# Patient Record
Sex: FEMALE | Race: WHITE | NOT HISPANIC OR LATINO | Employment: OTHER | URBAN - METROPOLITAN AREA
[De-identification: names, ages, dates, MRNs, and addresses within clinical notes are randomized per-mention and may not be internally consistent; named-entity substitution may affect disease eponyms.]

---

## 2023-11-25 DIAGNOSIS — E78.5 HYPERLIPIDEMIA, UNSPECIFIED HYPERLIPIDEMIA TYPE: Primary | ICD-10-CM

## 2023-11-27 RX ORDER — EZETIMIBE 10 MG/1
10 TABLET ORAL DAILY
Qty: 90 TABLET | Refills: 1 | Status: SHIPPED | OUTPATIENT
Start: 2023-11-27 | End: 2024-05-20

## 2023-12-12 ENCOUNTER — APPOINTMENT (OUTPATIENT)
Dept: PRIMARY CARE | Facility: CLINIC | Age: 83
End: 2023-12-12
Payer: COMMERCIAL

## 2024-02-21 DIAGNOSIS — I10 BENIGN ESSENTIAL HYPERTENSION: Primary | ICD-10-CM

## 2024-02-22 PROBLEM — I10 BENIGN ESSENTIAL HYPERTENSION: Status: ACTIVE | Noted: 2024-02-22

## 2024-02-22 RX ORDER — METOPROLOL TARTRATE 100 MG/1
100 TABLET ORAL 2 TIMES DAILY
Qty: 180 TABLET | Refills: 1 | Status: SHIPPED | OUTPATIENT
Start: 2024-02-22

## 2024-03-07 ENCOUNTER — PROCEDURE VISIT (OUTPATIENT)
Dept: PRIMARY CARE | Facility: CLINIC | Age: 84
End: 2024-03-07
Payer: COMMERCIAL

## 2024-03-07 VITALS — SYSTOLIC BLOOD PRESSURE: 154 MMHG | HEART RATE: 78 BPM | OXYGEN SATURATION: 96 % | DIASTOLIC BLOOD PRESSURE: 90 MMHG

## 2024-03-07 DIAGNOSIS — E78.2 MIXED HYPERLIPIDEMIA: ICD-10-CM

## 2024-03-07 DIAGNOSIS — R73.01 IMPAIRED FASTING GLUCOSE: ICD-10-CM

## 2024-03-07 DIAGNOSIS — I48.20 CHRONIC ATRIAL FIBRILLATION (MULTI): Primary | ICD-10-CM

## 2024-03-07 DIAGNOSIS — I10 BENIGN ESSENTIAL HYPERTENSION: ICD-10-CM

## 2024-03-07 DIAGNOSIS — M17.0 PRIMARY OSTEOARTHRITIS OF BOTH KNEES: ICD-10-CM

## 2024-03-07 PROCEDURE — 20610 DRAIN/INJ JOINT/BURSA W/O US: CPT | Mod: 50 | Performed by: PHYSICIAN ASSISTANT

## 2024-03-07 PROCEDURE — 20610 DRAIN/INJ JOINT/BURSA W/O US: CPT | Performed by: PHYSICIAN ASSISTANT

## 2024-03-07 PROCEDURE — 99213 OFFICE O/P EST LOW 20 MIN: CPT | Performed by: PHYSICIAN ASSISTANT

## 2024-03-07 RX ORDER — LOSARTAN POTASSIUM 100 MG/1
100 TABLET ORAL DAILY
COMMUNITY

## 2024-03-07 RX ORDER — FUROSEMIDE 40 MG/1
40 TABLET ORAL DAILY
COMMUNITY
Start: 2018-04-03

## 2024-03-07 RX ORDER — APIXABAN 5 MG/1
5 TABLET, FILM COATED ORAL 2 TIMES DAILY
COMMUNITY
End: 2024-03-25 | Stop reason: SDUPTHER

## 2024-03-07 RX ORDER — POTASSIUM CHLORIDE 750 MG/1
10 TABLET, EXTENDED RELEASE ORAL
COMMUNITY
End: 2024-05-13 | Stop reason: ALTCHOICE

## 2024-03-07 ASSESSMENT — PATIENT HEALTH QUESTIONNAIRE - PHQ9
SUM OF ALL RESPONSES TO PHQ9 QUESTIONS 1 AND 2: 0
1. LITTLE INTEREST OR PLEASURE IN DOING THINGS: NOT AT ALL
2. FEELING DOWN, DEPRESSED OR HOPELESS: NOT AT ALL

## 2024-03-07 NOTE — PROGRESS NOTES
Subjective   Patient ID: Beulah Dockery is a 83 y.o. female who presents for b/l cortisone shots  (Patient states she has been increasingly gaining weight since being put on BP meds and the water pills. ).    Hx of B/L knee OA with some response to CSI in the past requesting repeat injection. No other new concerns.          Review of Systems   All other systems reviewed and are negative.      Objective   /90   Pulse 78   SpO2 96%     Physical Exam  Constitutional:       General: She is not in acute distress.     Appearance: Normal appearance. She is obese.   HENT:      Nose: Nose normal.   Cardiovascular:      Rate and Rhythm: Normal rate and regular rhythm.   Musculoskeletal:      Comments: B/L knees with poor ROM, crepitus, joint space tenderness and mild effusions.    Neurological:      Mental Status: She is alert.         Assessment/Plan   Diagnoses and all orders for this visit:  Chronic atrial fibrillation (CMS/HCC)  Primary osteoarthritis of both knees  Benign essential hypertension  Mixed hyperlipidemia    B/L knee injections: each knee was cleansed with betadine and under sterile conditions using a 22g needle the joint was injected with Kenalog 40mg, lidocaine 2% 3ml through the lateral joint space. Pt tolerated the procedure well without complication.

## 2024-03-18 ENCOUNTER — APPOINTMENT (OUTPATIENT)
Dept: CARDIOLOGY | Facility: CLINIC | Age: 84
End: 2024-03-18
Payer: COMMERCIAL

## 2024-03-25 DIAGNOSIS — I48.91 ATRIAL FIBRILLATION, UNSPECIFIED TYPE (MULTI): Primary | ICD-10-CM

## 2024-03-25 RX ORDER — APIXABAN 5 MG/1
5 TABLET, FILM COATED ORAL 2 TIMES DAILY
Qty: 180 TABLET | Refills: 1 | Status: SHIPPED | OUTPATIENT
Start: 2024-03-25 | End: 2025-03-25

## 2024-03-27 ENCOUNTER — LAB (OUTPATIENT)
Dept: LAB | Facility: LAB | Age: 84
End: 2024-03-27
Payer: COMMERCIAL

## 2024-03-27 DIAGNOSIS — R73.01 IMPAIRED FASTING GLUCOSE: ICD-10-CM

## 2024-03-27 DIAGNOSIS — E78.2 MIXED HYPERLIPIDEMIA: ICD-10-CM

## 2024-03-27 LAB
ALBUMIN SERPL BCP-MCNC: 4 G/DL (ref 3.4–5)
ALP SERPL-CCNC: 78 U/L (ref 33–136)
ALT SERPL W P-5'-P-CCNC: 16 U/L (ref 7–45)
ANION GAP SERPL CALC-SCNC: 13 MMOL/L (ref 10–20)
AST SERPL W P-5'-P-CCNC: 16 U/L (ref 9–39)
BILIRUB SERPL-MCNC: 0.6 MG/DL (ref 0–1.2)
BUN SERPL-MCNC: 22 MG/DL (ref 6–23)
CALCIUM SERPL-MCNC: 10.2 MG/DL (ref 8.6–10.6)
CHLORIDE SERPL-SCNC: 103 MMOL/L (ref 98–107)
CHOLEST SERPL-MCNC: 233 MG/DL (ref 0–199)
CHOLESTEROL/HDL RATIO: 3.9
CO2 SERPL-SCNC: 31 MMOL/L (ref 21–32)
CREAT SERPL-MCNC: 0.77 MG/DL (ref 0.5–1.05)
EGFRCR SERPLBLD CKD-EPI 2021: 77 ML/MIN/1.73M*2
EST. AVERAGE GLUCOSE BLD GHB EST-MCNC: 137 MG/DL
GLUCOSE SERPL-MCNC: 128 MG/DL (ref 74–99)
HBA1C MFR BLD: 6.4 %
HDLC SERPL-MCNC: 59 MG/DL
LDLC SERPL CALC-MCNC: 142 MG/DL
NON HDL CHOLESTEROL: 174 MG/DL (ref 0–149)
POTASSIUM SERPL-SCNC: 4.6 MMOL/L (ref 3.5–5.3)
PROT SERPL-MCNC: 7 G/DL (ref 6.4–8.2)
SODIUM SERPL-SCNC: 142 MMOL/L (ref 136–145)
TRIGL SERPL-MCNC: 159 MG/DL (ref 0–149)
VLDL: 32 MG/DL (ref 0–40)

## 2024-03-27 PROCEDURE — 36415 COLL VENOUS BLD VENIPUNCTURE: CPT

## 2024-03-27 PROCEDURE — 83036 HEMOGLOBIN GLYCOSYLATED A1C: CPT

## 2024-03-27 PROCEDURE — 80053 COMPREHEN METABOLIC PANEL: CPT

## 2024-03-27 PROCEDURE — 80061 LIPID PANEL: CPT

## 2024-05-13 ENCOUNTER — OFFICE VISIT (OUTPATIENT)
Dept: CARDIOLOGY | Facility: CLINIC | Age: 84
End: 2024-05-13
Payer: COMMERCIAL

## 2024-05-13 VITALS — HEART RATE: 116 BPM | OXYGEN SATURATION: 96 % | DIASTOLIC BLOOD PRESSURE: 104 MMHG | SYSTOLIC BLOOD PRESSURE: 158 MMHG

## 2024-05-13 DIAGNOSIS — I10 BENIGN ESSENTIAL HYPERTENSION: ICD-10-CM

## 2024-05-13 DIAGNOSIS — I48.20 CHRONIC ATRIAL FIBRILLATION (MULTI): Primary | ICD-10-CM

## 2024-05-13 PROCEDURE — 1036F TOBACCO NON-USER: CPT | Performed by: INTERNAL MEDICINE

## 2024-05-13 PROCEDURE — 1159F MED LIST DOCD IN RCRD: CPT | Performed by: INTERNAL MEDICINE

## 2024-05-13 PROCEDURE — 3077F SYST BP >= 140 MM HG: CPT | Performed by: INTERNAL MEDICINE

## 2024-05-13 PROCEDURE — 99214 OFFICE O/P EST MOD 30 MIN: CPT | Performed by: INTERNAL MEDICINE

## 2024-05-13 PROCEDURE — 1126F AMNT PAIN NOTED NONE PRSNT: CPT | Performed by: INTERNAL MEDICINE

## 2024-05-13 PROCEDURE — 3080F DIAST BP >= 90 MM HG: CPT | Performed by: INTERNAL MEDICINE

## 2024-05-13 RX ORDER — AMLODIPINE BESYLATE 10 MG/1
10 TABLET ORAL DAILY
Qty: 90 TABLET | Refills: 3 | Status: SHIPPED | OUTPATIENT
Start: 2024-05-13 | End: 2025-05-13

## 2024-05-13 ASSESSMENT — COLUMBIA-SUICIDE SEVERITY RATING SCALE - C-SSRS
6. HAVE YOU EVER DONE ANYTHING, STARTED TO DO ANYTHING, OR PREPARED TO DO ANYTHING TO END YOUR LIFE?: NO
1. IN THE PAST MONTH, HAVE YOU WISHED YOU WERE DEAD OR WISHED YOU COULD GO TO SLEEP AND NOT WAKE UP?: NO
2. HAVE YOU ACTUALLY HAD ANY THOUGHTS OF KILLING YOURSELF?: NO

## 2024-05-13 ASSESSMENT — PAIN SCALES - GENERAL: PAINLEVEL: 0-NO PAIN

## 2024-05-13 ASSESSMENT — PATIENT HEALTH QUESTIONNAIRE - PHQ9
2. FEELING DOWN, DEPRESSED OR HOPELESS: NOT AT ALL
SUM OF ALL RESPONSES TO PHQ9 QUESTIONS 1 AND 2: 0
1. LITTLE INTEREST OR PLEASURE IN DOING THINGS: NOT AT ALL

## 2024-05-13 NOTE — PROGRESS NOTES
Primary Care Physician: Dario Shepherd PA-C  Date of Visit: 2024  1:15 PM EDT  Location of visit: [facility] [place] [address]     Chief Complaint:   Chief Complaint   Patient presents with    Follow-up     HPI / Summary:   Beulah Dockery is a 83 y.o. female presents for follow-up    ROS    Medical History:   She has a past medical history of Encounter for general adult medical examination without abnormal findings (2019) and Localized edema (2018).  Surgical Hx:   She has a past surgical history that includes Hysterectomy (2018); Other surgical history (2018); Gallbladder surgery (2018); and  section, classic (2018).   Social Hx:   She reports that she has never smoked. She has never used smokeless tobacco. She reports current alcohol use. She reports that she does not use drugs.  Family Hx:   Her family history is not on file.   Allergies:  Allergies   Allergen Reactions    Amlodipine Unknown     muscle pain and weakness    Atenolol Unknown     bradycardia, muscle weakness    Codeine Unknown and Nausea/vomiting    Gemfibrozil Unknown     muscle weakness    Hydrochlorothiazide Unknown     raised her glucose    Niacin Unknown     racing heart beat    Statins-Hmg-Coa Reductase Inhibitors Unknown     muscle pain    Vytorin-muscle pain     Outpatient Medications:  Current Outpatient Medications   Medication Instructions    amLODIPine (NORVASC) 10 mg, oral, Daily    Eliquis 5 mg, oral, 2 times daily    ezetimibe (ZETIA) 10 mg, oral, Daily    furosemide (LASIX) 40 mg, oral, Daily    losartan (COZAAR) 100 mg, oral, Daily    metoprolol tartrate (LOPRESSOR) 100 mg, oral, 2 times daily     Physical Exam:  Vitals:    24 1313 24 1341 24 1343   BP: (!) 164/104 (!) 158/104    BP Location: Left arm     Patient Position: Sitting     BP Cuff Size: Large adult     Pulse: 101  (!) 116   SpO2: 96%       Wt Readings from Last 5 Encounters:   23 121 kg (266 lb 5 oz)    02/28/23 122 kg (269 lb 6.4 oz)   02/21/23 121 kg (266 lb 9.6 oz)   09/19/22 117 kg (258 lb)   08/08/22 117 kg (259 lb)     Physical Exam  JVP not elevated. Carotid impulses are 2+ without overlying bruit.   Chest exhibits fair to good air movement with completely clear breath sounds.   The cardiac rhythm is regular with no premature beats.   Normal S1 and S2. No gallop, murmur or rub, or click.   Abdomen is soft and benign without focal tenderness.   With no lower leg edema. The pedal pulses are intact.     Last Labs:  Lab on 03/27/2024   Component Date Value    Glucose 03/27/2024 128 (H)     Sodium 03/27/2024 142     Potassium 03/27/2024 4.6     Chloride 03/27/2024 103     Bicarbonate 03/27/2024 31     Anion Gap 03/27/2024 13     Urea Nitrogen 03/27/2024 22     Creatinine 03/27/2024 0.77     eGFR 03/27/2024 77     Calcium 03/27/2024 10.2     Albumin 03/27/2024 4.0     Alkaline Phosphatase 03/27/2024 78     Total Protein 03/27/2024 7.0     AST 03/27/2024 16     Bilirubin, Total 03/27/2024 0.6     ALT 03/27/2024 16     Cholesterol 03/27/2024 233 (H)     HDL-Cholesterol 03/27/2024 59.0     Cholesterol/HDL Ratio 03/27/2024 3.9     LDL Calculated 03/27/2024 142 (H)     VLDL 03/27/2024 32     Triglycerides 03/27/2024 159 (H)     Non HDL Cholesterol 03/27/2024 174 (H)     Hemoglobin A1C 03/27/2024 6.4 (H)     Estimated Average Glucose 03/27/2024 137         Assessment/Plan   1. Chronic atrial fibrillation. This elderly white female does have a background history including hypertension obesity hyperlipidemia. The patient was identified as having chronic atrial fibrillation in 4/2020. At that time she had an external cardiac monitor which documented 100% atrial fibrillation. The patient is relatively asymptomatic with respect to the atrial fibrillation without any palpitations. The ventricular rate is in the upper range of normal on the current metoprolol tartrate 25 mg twice daily. Will change the patient and uptitrate  the beta-blockade by switching to Toprol- mg daily. Of note the patient did have a previous echocardiogram performed on 2020 demonstrating a preserved LV ejection fraction at 65-70% with severe left atrial enlargement the patient will remain on eliquis anticoagulation.  Clinically patient is feeling well unaware of palpitations.  Heart rate is relatively rapid today but she states that she is taking the metoprolol 100 mg twice daily.  Medication compliance was emphasized.  Patient will have an echocardiogram performed at the time of next visit.     2. Eliquis anticoagulation.     3. Hypertension. The patient's blood pressure is elevated and will restart amlodipine 10 mg daily which the patient had taken in the past.  She will remain on the losartan 100 mg daily and metoprolol tartrate 100 mg p.o. patient will be allowed to discontinue potassium supplement.     4. Hyperlipidemia. The patient's untreated lipid panel 2018 included cholesterol 267 . She has had some difficulty tolerating statins. She did have a lipid panel on 2021 with cholesterol 208 HDL 51  triglyceride 236. She may have been on a statin agent at that time. She is currently on Zetia 10 mg daily.  Lab work from 3/27/2024 includes cholesterol 237  HDL 59 triglyceride 159.  Patient is taking Zetia but refuses statin.     5. Positive family history of heart disease. The patient had 1 older brother who  at age 80 of MI. She has 1 brother who is living with cancer another with Alzheimer's disease.     6. Cervical spinal DJD, status post cervical spine surgery.     7. Obesity.     8. Lower extremity edema due to venous insufficiency. Patient is taking Lasix 40 mg daily for her peripheral edema.     9. CAD. Had coronary artery score of 0 when done 2022.    10.  Glucose intolerance.  Patient's glycohemoglobin was 6.4% on 3/27/2024.  She currently is on no therapy her electrolyte panel from 3/27/2024 included  normal creatinine 0.71.          Orders:  Orders Placed This Encounter   Procedures    Transthoracic echo (TTE) complete      Followup Appts:  Future Appointments   Date Time Provider Department Center   11/8/2024 11:00 AM Ron Moya MD [id number] [facility]           ____________________________________________________________  Ron Moya MD  [facility]  [facility]

## 2024-05-17 DIAGNOSIS — E78.5 HYPERLIPIDEMIA, UNSPECIFIED HYPERLIPIDEMIA TYPE: ICD-10-CM

## 2024-05-20 RX ORDER — EZETIMIBE 10 MG/1
10 TABLET ORAL DAILY
Qty: 90 TABLET | Refills: 1 | Status: SHIPPED | OUTPATIENT
Start: 2024-05-20

## 2024-08-09 DIAGNOSIS — I10 BENIGN ESSENTIAL HYPERTENSION: ICD-10-CM

## 2024-08-12 RX ORDER — METOPROLOL TARTRATE 100 MG/1
100 TABLET ORAL 2 TIMES DAILY
Qty: 180 TABLET | Refills: 1 | Status: SHIPPED | OUTPATIENT
Start: 2024-08-12

## 2024-08-26 DIAGNOSIS — M25.473 ANKLE SWELLING: Primary | ICD-10-CM

## 2024-08-26 RX ORDER — FUROSEMIDE 40 MG/1
40 TABLET ORAL DAILY
Qty: 90 TABLET | Refills: 3 | Status: SHIPPED | OUTPATIENT
Start: 2024-08-26 | End: 2025-08-26

## 2024-09-17 DIAGNOSIS — I48.91 ATRIAL FIBRILLATION, UNSPECIFIED TYPE (MULTI): Primary | ICD-10-CM

## 2024-09-17 RX ORDER — APIXABAN 5 MG/1
5 TABLET, FILM COATED ORAL 2 TIMES DAILY
Qty: 180 TABLET | Refills: 3 | Status: SHIPPED | OUTPATIENT
Start: 2024-09-17 | End: 2025-09-17

## 2024-10-24 ENCOUNTER — HOSPITAL ENCOUNTER (OUTPATIENT)
Dept: CARDIOLOGY | Facility: CLINIC | Age: 84
Discharge: HOME | End: 2024-10-24
Payer: COMMERCIAL

## 2024-10-24 DIAGNOSIS — I48.20 CHRONIC ATRIAL FIBRILLATION (MULTI): ICD-10-CM

## 2024-10-24 PROCEDURE — 93306 TTE W/DOPPLER COMPLETE: CPT

## 2024-10-24 PROCEDURE — 2500000004 HC RX 250 GENERAL PHARMACY W/ HCPCS (ALT 636 FOR OP/ED): Performed by: INTERNAL MEDICINE

## 2024-10-24 PROCEDURE — 93306 TTE W/DOPPLER COMPLETE: CPT | Performed by: INTERNAL MEDICINE

## 2024-10-27 LAB
AORTIC VALVE PEAK VELOCITY: 1.92 M/S
AV PEAK GRADIENT: 14.8 MMHG
AVA (PEAK VEL): 2.06 CM2
EJECTION FRACTION: 63 %
LEFT ATRIUM VOLUME AREA LENGTH INDEX BSA: 40.3 ML/M2
LEFT VENTRICLE INTERNAL DIMENSION DIASTOLE: 4.54 CM (ref 3.5–6)
LEFT VENTRICULAR OUTFLOW TRACT DIAMETER: 1.86 CM
RIGHT VENTRICLE FREE WALL PEAK S': 13.62 CM/S
RIGHT VENTRICLE PEAK SYSTOLIC PRESSURE: 41.1 MMHG
TRICUSPID ANNULAR PLANE SYSTOLIC EXCURSION: 2.2 CM

## 2024-11-07 DIAGNOSIS — E78.5 HYPERLIPIDEMIA, UNSPECIFIED HYPERLIPIDEMIA TYPE: ICD-10-CM

## 2024-11-08 ENCOUNTER — APPOINTMENT (OUTPATIENT)
Dept: CARDIOLOGY | Facility: CLINIC | Age: 84
End: 2024-11-08
Payer: COMMERCIAL

## 2024-11-08 RX ORDER — EZETIMIBE 10 MG/1
10 TABLET ORAL DAILY
Qty: 90 TABLET | Refills: 1 | Status: SHIPPED | OUTPATIENT
Start: 2024-11-08

## 2024-12-09 ENCOUNTER — OFFICE VISIT (OUTPATIENT)
Dept: CARDIOLOGY | Facility: CLINIC | Age: 84
End: 2024-12-09
Payer: COMMERCIAL

## 2024-12-09 VITALS
DIASTOLIC BLOOD PRESSURE: 88 MMHG | BODY MASS INDEX: 52.22 KG/M2 | HEART RATE: 100 BPM | OXYGEN SATURATION: 97 % | HEIGHT: 60 IN | WEIGHT: 266 LBS | SYSTOLIC BLOOD PRESSURE: 128 MMHG

## 2024-12-09 DIAGNOSIS — I10 BENIGN ESSENTIAL HYPERTENSION: Primary | ICD-10-CM

## 2024-12-09 PROCEDURE — 1159F MED LIST DOCD IN RCRD: CPT | Performed by: INTERNAL MEDICINE

## 2024-12-09 PROCEDURE — 99214 OFFICE O/P EST MOD 30 MIN: CPT | Performed by: INTERNAL MEDICINE

## 2024-12-09 PROCEDURE — 1160F RVW MEDS BY RX/DR IN RCRD: CPT | Performed by: INTERNAL MEDICINE

## 2024-12-09 PROCEDURE — 3074F SYST BP LT 130 MM HG: CPT | Performed by: INTERNAL MEDICINE

## 2024-12-09 PROCEDURE — 3079F DIAST BP 80-89 MM HG: CPT | Performed by: INTERNAL MEDICINE

## 2024-12-09 PROCEDURE — 1036F TOBACCO NON-USER: CPT | Performed by: INTERNAL MEDICINE

## 2024-12-09 PROCEDURE — 1126F AMNT PAIN NOTED NONE PRSNT: CPT | Performed by: INTERNAL MEDICINE

## 2024-12-09 RX ORDER — AMLODIPINE BESYLATE 5 MG/1
5 TABLET ORAL DAILY
Qty: 90 TABLET | Refills: 3 | Status: SHIPPED | OUTPATIENT
Start: 2024-12-09 | End: 2025-12-09

## 2024-12-09 ASSESSMENT — PAIN SCALES - GENERAL: PAINLEVEL_OUTOF10: 0-NO PAIN

## 2024-12-09 ASSESSMENT — COLUMBIA-SUICIDE SEVERITY RATING SCALE - C-SSRS
2. HAVE YOU ACTUALLY HAD ANY THOUGHTS OF KILLING YOURSELF?: NO
6. HAVE YOU EVER DONE ANYTHING, STARTED TO DO ANYTHING, OR PREPARED TO DO ANYTHING TO END YOUR LIFE?: NO
1. IN THE PAST MONTH, HAVE YOU WISHED YOU WERE DEAD OR WISHED YOU COULD GO TO SLEEP AND NOT WAKE UP?: NO

## 2024-12-09 ASSESSMENT — PATIENT HEALTH QUESTIONNAIRE - PHQ9
1. LITTLE INTEREST OR PLEASURE IN DOING THINGS: NOT AT ALL
SUM OF ALL RESPONSES TO PHQ9 QUESTIONS 1 AND 2: 0
2. FEELING DOWN, DEPRESSED OR HOPELESS: NOT AT ALL

## 2024-12-09 NOTE — PROGRESS NOTES
Primary Care Physician: Dario Shepherd PA-C  Date of Visit: 2024 10:30 AM EST  Location of visit: [facility] [place] [address]     Chief Complaint:   No chief complaint on file.    HPI / Summary:   Beulah Dockery is a 84 y.o. female presents for follow-up    ROS    Medical History:   She has a past medical history of Encounter for general adult medical examination without abnormal findings (2019) and Localized edema (2018).  Surgical Hx:   She has a past surgical history that includes Hysterectomy (2018); Other surgical history (2018); Gallbladder surgery (2018); and  section, classic (2018).   Social Hx:   She reports that she has never smoked. She has never used smokeless tobacco. She reports that she does not currently use alcohol. She reports that she does not use drugs.  Family Hx:   Her family history is not on file.   Allergies:  Allergies   Allergen Reactions    Amlodipine Unknown     muscle pain and weakness    Atenolol Unknown     bradycardia, muscle weakness    Codeine Unknown and Nausea/vomiting    Gemfibrozil Unknown     muscle weakness    Hydrochlorothiazide Unknown     raised her glucose    Niacin Unknown     racing heart beat    Statins-Hmg-Coa Reductase Inhibitors Unknown     muscle pain    Vytorin-muscle pain     Outpatient Medications:  Current Outpatient Medications   Medication Instructions    amLODIPine (NORVASC) 10 mg, oral, Daily    Eliquis 5 mg, oral, 2 times daily    ezetimibe (ZETIA) 10 mg, oral, Daily    furosemide (LASIX) 40 mg, oral, Daily    losartan (COZAAR) 100 mg, Daily    metoprolol tartrate (LOPRESSOR) 100 mg, oral, 2 times daily     Physical Exam:  Vitals:    24 1019   Pulse: 101   SpO2: 97%   Weight: 121 kg (266 lb)   Height: 1.524 m (5')     Wt Readings from Last 5 Encounters:   24 121 kg (266 lb)   23 121 kg (266 lb 5 oz)   23 122 kg (269 lb 6.4 oz)   23 121 kg (266 lb 9.6 oz)   22 117 kg (258 lb)      Physical Exam  JVP not elevated. Carotid impulses are 2+ without overlying bruit.   Chest exhibits fair to good air movement with completely clear breath sounds.   The cardiac rhythm is regular with no premature beats.   Normal S1 and S2. No gallop, murmur or rub, or click.   Abdomen is soft and benign without focal tenderness.   With no lower leg edema. The pedal pulses are intact.     Last Labs:  Hospital Outpatient Visit on 10/24/2024   Component Date Value    AV pk chen 10/24/2024 1.92     LVOT diam 10/24/2024 1.86     LA vol index A/L 10/24/2024 40.3     Tricuspid annular plane * 10/24/2024 2.2     LV EF 10/24/2024 63     RV free wall pk S' 10/24/2024 13.62     LVIDd 10/24/2024 4.54     RVSP 10/24/2024 41.1     Aortic Valve Area by Con* 10/24/2024 2.06     AV pk grad 10/24/2024 14.8         Assessment/Plan   1. Chronic atrial fibrillation. This elderly white female does have a background history including hypertension obesity hyperlipidemia. The patient was identified as having chronic atrial fibrillation in 4/2020. At that time she had an external cardiac monitor which documented 100% atrial fibrillation. The patient is relatively asymptomatic with respect to the atrial fibrillation without any palpitations. The ventricular rate is in the upper range of normal on the current metoprolol tartrate 25 mg twice daily. Will change the patient and uptitrate the beta-blockade by switching to Toprol- mg daily. Of note the patient did have a previous echocardiogram performed on 4/28/2020 demonstrating a preserved LV ejection fraction at 65-70% with severe left atrial enlargement the patient will remain on eliquis anticoagulation.  Clinically patient is feeling well unaware of palpitations.  Heart rate is relatively rapid today but she states that she is taking the metoprolol 100 mg twice daily.  Medication compliance was emphasized.  Patient will have an echocardiogram performed at the time of next visit.   Echocardiogram performed 10/24/2024 demonstrates an estimated LV ejection fraction of 60 to 65% trace-1+ mitral valve regurgitation mild left atrial enlargement mild pulmonary hypertension estimated PA systolic pressure 41 mmHg.  Patient unaware of any palpitations no lightheadedness or dizziness.     2. Eliquis anticoagulation.     3. Hypertension. The patient's blood pressure is elevated and will restart amlodipine 10 mg daily which the patient had taken in the past.  She will remain on the losartan 100 mg daily and metoprolol tartrate 100 mg p.o. patient will be allowed to discontinue potassium supplement.  Blood pressure well within normal range currently and will reduce dose of amlodipine from 10 to 5 mg daily especially given her lower extremity venous insufficiency and lymphedema.     4. Hyperlipidemia. The patient's untreated lipid panel 2018 included cholesterol 267 . She has had some difficulty tolerating statins. She did have a lipid panel on 2021 with cholesterol 208 HDL 51  triglyceride 236. She may have been on a statin agent at that time. She is currently on Zetia 10 mg daily.  Lab work from 3/27/2024 includes cholesterol 237  HDL 59 triglyceride 159.  Patient is taking Zetia but refuses statin.     5. Positive family history of heart disease. The patient had 1 older brother who  at age 80 of MI. She has 1 brother who is living with cancer another with Alzheimer's disease.     6. Cervical spinal DJD, status post cervical spine surgery.     7. Obesity.     8. Lower extremity edema due to venous insufficiency. Patient is taking Lasix 40 mg daily for her peripheral edema.     9. CAD. Had coronary artery score of 0 when done 2022.    10.  Glucose intolerance.  Patient's glycohemoglobin was 6.4% on 3/27/2024.  She currently is on no therapy her electrolyte panel from 3/27/2024 included normal creatinine 0.71.    11.  Bilateral knee DJD.  Patient is receiving cortisone  injections to the knees with only minimal improvement.          Orders:  No orders of the defined types were placed in this encounter.     Followup Appts:  No future appointments.          ____________________________________________________________  Ron Moya MD  [facility]  [facility]

## 2024-12-16 ENCOUNTER — APPOINTMENT (OUTPATIENT)
Dept: CARDIOLOGY | Facility: CLINIC | Age: 84
End: 2024-12-16
Payer: COMMERCIAL

## 2025-01-13 ENCOUNTER — APPOINTMENT (OUTPATIENT)
Dept: PRIMARY CARE | Facility: CLINIC | Age: 85
End: 2025-01-13
Payer: COMMERCIAL

## 2025-01-28 ENCOUNTER — APPOINTMENT (OUTPATIENT)
Dept: PRIMARY CARE | Facility: CLINIC | Age: 85
End: 2025-01-28
Payer: COMMERCIAL

## 2025-02-05 DIAGNOSIS — I10 BENIGN ESSENTIAL HYPERTENSION: ICD-10-CM

## 2025-02-05 RX ORDER — METOPROLOL TARTRATE 100 MG/1
100 TABLET ORAL 2 TIMES DAILY
Qty: 180 TABLET | Refills: 1 | Status: SHIPPED | OUTPATIENT
Start: 2025-02-05

## 2025-05-07 DIAGNOSIS — E78.5 HYPERLIPIDEMIA, UNSPECIFIED HYPERLIPIDEMIA TYPE: ICD-10-CM

## 2025-05-07 RX ORDER — EZETIMIBE 10 MG/1
10 TABLET ORAL DAILY
Qty: 90 TABLET | Refills: 1 | Status: SHIPPED | OUTPATIENT
Start: 2025-05-07

## 2025-06-16 ENCOUNTER — APPOINTMENT (OUTPATIENT)
Dept: CARDIOLOGY | Facility: CLINIC | Age: 85
End: 2025-06-16
Payer: COMMERCIAL

## 2025-08-02 ENCOUNTER — TELEPHONE (OUTPATIENT)
Dept: PRIMARY CARE | Facility: CLINIC | Age: 85
End: 2025-08-02
Payer: COMMERCIAL

## 2025-08-02 DIAGNOSIS — I10 BENIGN ESSENTIAL HYPERTENSION: ICD-10-CM

## 2025-08-04 RX ORDER — METOPROLOL TARTRATE 100 MG/1
100 TABLET ORAL 2 TIMES DAILY
Qty: 180 TABLET | Refills: 1 | Status: SHIPPED | OUTPATIENT
Start: 2025-08-04

## 2025-08-15 ENCOUNTER — APPOINTMENT (OUTPATIENT)
Dept: CARDIOLOGY | Facility: CLINIC | Age: 85
End: 2025-08-15
Payer: COMMERCIAL

## 2025-09-04 ENCOUNTER — APPOINTMENT (OUTPATIENT)
Dept: PRIMARY CARE | Facility: CLINIC | Age: 85
End: 2025-09-04
Payer: COMMERCIAL